# Patient Record
Sex: FEMALE | Race: OTHER | NOT HISPANIC OR LATINO | URBAN - METROPOLITAN AREA
[De-identification: names, ages, dates, MRNs, and addresses within clinical notes are randomized per-mention and may not be internally consistent; named-entity substitution may affect disease eponyms.]

---

## 2017-03-18 ENCOUNTER — OUTPATIENT (OUTPATIENT)
Dept: OUTPATIENT SERVICES | Facility: HOSPITAL | Age: 52
LOS: 1 days | Discharge: HOME | End: 2017-03-18

## 2017-06-27 DIAGNOSIS — K75.9 INFLAMMATORY LIVER DISEASE, UNSPECIFIED: ICD-10-CM

## 2017-06-27 DIAGNOSIS — D64.9 ANEMIA, UNSPECIFIED: ICD-10-CM

## 2017-06-27 DIAGNOSIS — E78.9 DISORDER OF LIPOPROTEIN METABOLISM, UNSPECIFIED: ICD-10-CM

## 2018-01-17 ENCOUNTER — OUTPATIENT (OUTPATIENT)
Dept: OUTPATIENT SERVICES | Facility: HOSPITAL | Age: 53
LOS: 1 days | Discharge: HOME | End: 2018-01-17

## 2018-01-17 DIAGNOSIS — B26.9 MUMPS WITHOUT COMPLICATION: ICD-10-CM

## 2018-01-17 DIAGNOSIS — K11.23 CHRONIC SIALOADENITIS: ICD-10-CM

## 2018-01-17 DIAGNOSIS — D64.9 ANEMIA, UNSPECIFIED: ICD-10-CM

## 2018-01-17 DIAGNOSIS — I10 ESSENTIAL (PRIMARY) HYPERTENSION: ICD-10-CM

## 2018-01-17 DIAGNOSIS — E53.8 DEFICIENCY OF OTHER SPECIFIED B GROUP VITAMINS: ICD-10-CM

## 2018-01-17 DIAGNOSIS — K75.9 INFLAMMATORY LIVER DISEASE, UNSPECIFIED: ICD-10-CM

## 2018-01-17 DIAGNOSIS — E11.9 TYPE 2 DIABETES MELLITUS WITHOUT COMPLICATIONS: ICD-10-CM

## 2018-01-17 DIAGNOSIS — E03.9 HYPOTHYROIDISM, UNSPECIFIED: ICD-10-CM

## 2018-01-17 DIAGNOSIS — N39.0 URINARY TRACT INFECTION, SITE NOT SPECIFIED: ICD-10-CM

## 2018-01-17 DIAGNOSIS — E78.5 HYPERLIPIDEMIA, UNSPECIFIED: ICD-10-CM

## 2018-02-19 ENCOUNTER — OUTPATIENT (OUTPATIENT)
Dept: OUTPATIENT SERVICES | Facility: HOSPITAL | Age: 53
LOS: 1 days | Discharge: HOME | End: 2018-02-19

## 2018-02-19 DIAGNOSIS — R06.02 SHORTNESS OF BREATH: ICD-10-CM

## 2018-09-27 ENCOUNTER — OUTPATIENT (OUTPATIENT)
Dept: OUTPATIENT SERVICES | Facility: HOSPITAL | Age: 53
LOS: 1 days | Discharge: HOME | End: 2018-09-27

## 2018-09-27 DIAGNOSIS — I10 ESSENTIAL (PRIMARY) HYPERTENSION: ICD-10-CM

## 2018-09-27 DIAGNOSIS — E83.42 HYPOMAGNESEMIA: ICD-10-CM

## 2018-09-27 DIAGNOSIS — N39.0 URINARY TRACT INFECTION, SITE NOT SPECIFIED: ICD-10-CM

## 2018-09-27 DIAGNOSIS — D64.9 ANEMIA, UNSPECIFIED: ICD-10-CM

## 2018-09-27 DIAGNOSIS — E78.5 HYPERLIPIDEMIA, UNSPECIFIED: ICD-10-CM

## 2018-09-27 DIAGNOSIS — E55.9 VITAMIN D DEFICIENCY, UNSPECIFIED: ICD-10-CM

## 2018-09-27 DIAGNOSIS — E53.8 DEFICIENCY OF OTHER SPECIFIED B GROUP VITAMINS: ICD-10-CM

## 2018-09-27 DIAGNOSIS — E03.9 HYPOTHYROIDISM, UNSPECIFIED: ICD-10-CM

## 2018-09-27 DIAGNOSIS — E11.9 TYPE 2 DIABETES MELLITUS WITHOUT COMPLICATIONS: ICD-10-CM

## 2018-09-27 DIAGNOSIS — K75.9 INFLAMMATORY LIVER DISEASE, UNSPECIFIED: ICD-10-CM

## 2020-12-22 ENCOUNTER — OUTPATIENT (OUTPATIENT)
Dept: OUTPATIENT SERVICES | Facility: HOSPITAL | Age: 55
LOS: 1 days | Discharge: HOME | End: 2020-12-22
Payer: COMMERCIAL

## 2020-12-22 DIAGNOSIS — R10.12 LEFT UPPER QUADRANT PAIN: ICD-10-CM

## 2020-12-22 DIAGNOSIS — E04.1 NONTOXIC SINGLE THYROID NODULE: ICD-10-CM

## 2020-12-22 PROCEDURE — 76536 US EXAM OF HEAD AND NECK: CPT | Mod: 26

## 2020-12-22 PROCEDURE — 76700 US EXAM ABDOM COMPLETE: CPT | Mod: 26

## 2020-12-28 ENCOUNTER — OUTPATIENT (OUTPATIENT)
Dept: OUTPATIENT SERVICES | Facility: HOSPITAL | Age: 55
LOS: 1 days | Discharge: HOME | End: 2020-12-28
Payer: COMMERCIAL

## 2020-12-28 DIAGNOSIS — Z12.31 ENCOUNTER FOR SCREENING MAMMOGRAM FOR MALIGNANT NEOPLASM OF BREAST: ICD-10-CM

## 2020-12-28 DIAGNOSIS — R91.1 SOLITARY PULMONARY NODULE: ICD-10-CM

## 2020-12-28 PROCEDURE — 71250 CT THORAX DX C-: CPT | Mod: 26

## 2020-12-28 PROCEDURE — 77067 SCR MAMMO BI INCL CAD: CPT | Mod: 26

## 2020-12-28 PROCEDURE — 77063 BREAST TOMOSYNTHESIS BI: CPT | Mod: 26

## 2021-06-22 VITALS
HEIGHT: 65 IN | DIASTOLIC BLOOD PRESSURE: 80 MMHG | WEIGHT: 163.14 LBS | SYSTOLIC BLOOD PRESSURE: 130 MMHG | TEMPERATURE: 97 F | BODY MASS INDEX: 27.18 KG/M2 | HEART RATE: 70 BPM

## 2023-02-15 ENCOUNTER — NON-APPOINTMENT (OUTPATIENT)
Age: 58
End: 2023-02-15

## 2023-02-15 PROBLEM — Z00.00 ENCOUNTER FOR PREVENTIVE HEALTH EXAMINATION: Status: ACTIVE | Noted: 2023-02-15

## 2023-02-15 RX ORDER — FENOFIBRATE 145 MG/1
145 TABLET, COATED ORAL AT BEDTIME
Refills: 0 | Status: ACTIVE | COMMUNITY

## 2023-02-15 RX ORDER — VALSARTAN AND HYDROCHLOROTHIAZIDE 320; 25 MG/1; MG/1
320-25 TABLET, FILM COATED ORAL DAILY
Refills: 0 | Status: ACTIVE | COMMUNITY

## 2023-05-16 NOTE — PHYSICAL EXAM
[Well Developed] : well developed [Normal Conjunctiva] : normal conjunctiva [Normal Venous Pressure] : normal venous pressure [Normal S1, S2] : normal S1, S2 [Clear Lung Fields] : clear lung fields [Soft] : abdomen soft [Normal Gait] : normal gait [No Edema] : no edema [No Rash] : no rash [Moves all extremities] : moves all extremities [Alert and Oriented] : alert and oriented

## 2023-05-17 ENCOUNTER — APPOINTMENT (OUTPATIENT)
Dept: CARDIOLOGY | Facility: CLINIC | Age: 58
End: 2023-05-17
Payer: MEDICAID

## 2023-05-17 VITALS
BODY MASS INDEX: 26.22 KG/M2 | SYSTOLIC BLOOD PRESSURE: 140 MMHG | HEART RATE: 70 BPM | DIASTOLIC BLOOD PRESSURE: 90 MMHG | WEIGHT: 163.13 LBS | HEIGHT: 66 IN

## 2023-05-17 VITALS — DIASTOLIC BLOOD PRESSURE: 90 MMHG | SYSTOLIC BLOOD PRESSURE: 130 MMHG

## 2023-05-17 DIAGNOSIS — R94.31 ABNORMAL ELECTROCARDIOGRAM [ECG] [EKG]: ICD-10-CM

## 2023-05-17 PROCEDURE — 93000 ELECTROCARDIOGRAM COMPLETE: CPT

## 2023-05-17 PROCEDURE — 99204 OFFICE O/P NEW MOD 45 MIN: CPT | Mod: 25

## 2023-05-17 RX ORDER — ATORVASTATIN CALCIUM 20 MG/1
20 TABLET, FILM COATED ORAL
Refills: 0 | Status: ACTIVE | COMMUNITY
Start: 2023-05-17

## 2023-06-19 ENCOUNTER — APPOINTMENT (OUTPATIENT)
Dept: CARDIOLOGY | Facility: CLINIC | Age: 58
End: 2023-06-19
Payer: MEDICAID

## 2023-06-19 PROCEDURE — 93306 TTE W/DOPPLER COMPLETE: CPT

## 2023-07-11 ENCOUNTER — OUTPATIENT (OUTPATIENT)
Dept: OUTPATIENT SERVICES | Facility: HOSPITAL | Age: 58
LOS: 1 days | End: 2023-07-11
Payer: MEDICAID

## 2023-07-11 DIAGNOSIS — R94.31 ABNORMAL ELECTROCARDIOGRAM [ECG] [EKG]: ICD-10-CM

## 2023-07-11 DIAGNOSIS — Z00.8 ENCOUNTER FOR OTHER GENERAL EXAMINATION: ICD-10-CM

## 2023-07-11 PROCEDURE — 78452 HT MUSCLE IMAGE SPECT MULT: CPT

## 2023-07-11 PROCEDURE — A9500: CPT

## 2023-07-11 PROCEDURE — 93018 CV STRESS TEST I&R ONLY: CPT

## 2023-07-11 PROCEDURE — 78452 HT MUSCLE IMAGE SPECT MULT: CPT | Mod: 26

## 2023-07-12 DIAGNOSIS — R94.31 ABNORMAL ELECTROCARDIOGRAM [ECG] [EKG]: ICD-10-CM

## 2023-07-14 NOTE — HISTORY OF PRESENT ILLNESS
[FreeTextEntry1] : Pt with HTN, HLD, sent for cv evaluation for the last 2-3 months pt having sharp cp usually at rest with no sob, sometimes diaphoresis but not sure if it occurs with cp. pt does not exercise.\par \par 6/10/21: EXERCISE STRESS NUCLEAR: NEGATIVE FOR ISCHEMIA PT EXERCISED FOR 8:30 WITH NO CP \par \par 6/22/21: Pt says sharp cp at rest on/off once a month, minor, lasing 2 minutes to 1 hour. pt admits to a lot of stress.\par \par 5/17/23: \par Patient states she is having cp like before described as sometimes sharp lasting for a few seconds to a few minutes  at rest denies sob no diaphoresis. Patient walks one mile almost daily. Patient states she checks BP at home 130-140/80-90. Patient under stress. \par pt no longer on fenofibrate and on atorvastatin. \par pt says cp not worse with deep inspirations. \par \par ekg: nsr with irbbb with flatted t waves v3 to v6 with flatterning lead III/avf different than in 2021. \par \par 7/27/23:\par 7/11/23: Nuclear: Normal myocardial perfusion. No evidence of ischemia or infarction. Stress EKG was negative for ischemia. The Duke treadmill score is +7, which confers low risk. LVEF of > 55%

## 2023-07-14 NOTE — DISCUSSION/SUMMARY
[EKG obtained to assist in diagnosis and management of assessed problem(s)] : EKG obtained to assist in diagnosis and management of assessed problem(s) [FreeTextEntry1] : Get Blood Work \par get exercise stress nuclear \par 2 d echo \par f/u in after testing \par baseline st changes will get nuclear. \par start asa 81 mg po qd for time being \par if severe cp go to hospital to er

## 2023-07-14 NOTE — CARDIOLOGY SUMMARY
[de-identified] : 7/11/23: Nuclear: Normal myocardial perfusion. No evidence of ischemia or infarction. Stress EKG was negative for ischemia. The Duke treadmill score is +7, which confers low risk. LVEF of > 55%

## 2023-07-27 ENCOUNTER — APPOINTMENT (OUTPATIENT)
Dept: CARDIOLOGY | Facility: CLINIC | Age: 58
End: 2023-07-27
Payer: MEDICAID

## 2023-07-27 VITALS — HEIGHT: 65 IN | WEIGHT: 165 LBS | BODY MASS INDEX: 27.49 KG/M2

## 2023-07-27 VITALS — DIASTOLIC BLOOD PRESSURE: 80 MMHG | SYSTOLIC BLOOD PRESSURE: 130 MMHG | HEART RATE: 80 BPM

## 2023-07-27 DIAGNOSIS — R73.03 PREDIABETES.: ICD-10-CM

## 2023-07-27 DIAGNOSIS — E88.81 METABOLIC SYNDROME: ICD-10-CM

## 2023-07-27 DIAGNOSIS — E78.2 MIXED HYPERLIPIDEMIA: ICD-10-CM

## 2023-07-27 DIAGNOSIS — R07.89 OTHER CHEST PAIN: ICD-10-CM

## 2023-07-27 PROCEDURE — 99214 OFFICE O/P EST MOD 30 MIN: CPT

## 2023-07-27 NOTE — HISTORY OF PRESENT ILLNESS
[FreeTextEntry1] : Pt with HTN, HLD, IRBBB LAE, prediabetic, metabolic syndrome. \par \par 6/10/21: EXERCISE STRESS NUCLEAR: NEGATIVE FOR ISCHEMIA PT EXERCISED FOR 8:30 WITH NO CP \par \par 21: Pt says sharp cp at rest on/off once a month, minor, lasing 2 minutes to 1 hour. pt admits to a lot of stress.\par \par 23: \par Patient states she is having cp like before described as sometimes sharp lasting for a few seconds to a few minutes  at rest denies sob no diaphoresis. Patient walks one mile almost daily. Patient states she checks BP at home 130-140/80-90. Patient under stress. \par pt no longer on fenofibrate and on atorvastatin. \par pt says cp not worse with deep inspirations. \par \par ekg: nsr with irbbb with flatted t waves v3 to v6 with flatterning lead III/avf different than in . \par \par 23:\par 23: Nuclear: Normal myocardial perfusion. No evidence of ischemia The Duke treadmill score is +7, which confers low risk. LVEF of > 55%\par 23: EF: 65%. E' sept: 0.06, CO: 6.1, DD1, borderline LAE \par :  HDL 38 T a1c: 5.7 \par d/w patient and has been on atorvastatin/fenofibrate for 2 years and says had bloodwork 2 weeks ago at [facility]. \par pt apparently in november as bad diet from being in [place]. \par pt does not exercise and had recent child on .

## 2023-07-27 NOTE — CARDIOLOGY SUMMARY
[de-identified] : nsr irbbb with flattened t waves v3 to v6  [de-identified] : 6/20/23: EF: 65%. E' sept: 0.06, CO: 6.1, DD1, borderlone LAE  [de-identified] : 7/11/23: Nuclear: Normal myocardial perfusion. No evidence of ischemia or infarction. Stress EKG was negative for ischemia. The Duke treadmill score is +7, which confers low risk. LVEF of > 55%

## 2023-07-27 NOTE — DISCUSSION/SUMMARY
[FreeTextEntry1] : Get Blood Work \par continue atorvastatin/fenofibrate \par and exericise more for HDL d/w patient at length \par get bloodwork \par f/u in 6 months \par

## 2024-01-17 NOTE — CARDIOLOGY SUMMARY
[de-identified] : nsr irbbb with flattened t waves v3 to v6  [de-identified] : 6/20/23: EF: 65%. E' sept: 0.06, CO: 6.1, DD1, borderlone LAE  [de-identified] : 7/11/23: Nuclear: Normal myocardial perfusion. No evidence of ischemia or infarction. Stress EKG was negative for ischemia. The Duke treadmill score is +7, which confers low risk. LVEF of > 55%

## 2024-01-18 ENCOUNTER — APPOINTMENT (OUTPATIENT)
Dept: CARDIOLOGY | Facility: CLINIC | Age: 59
End: 2024-01-18

## 2024-02-08 ENCOUNTER — APPOINTMENT (OUTPATIENT)
Dept: NEUROLOGY | Facility: CLINIC | Age: 59
End: 2024-02-08
Payer: MEDICAID

## 2024-02-08 VITALS — WEIGHT: 160 LBS | HEIGHT: 66 IN | BODY MASS INDEX: 25.71 KG/M2

## 2024-02-08 VITALS — DIASTOLIC BLOOD PRESSURE: 74 MMHG | SYSTOLIC BLOOD PRESSURE: 149 MMHG | HEART RATE: 78 BPM

## 2024-02-08 DIAGNOSIS — R79.89 OTHER SPECIFIED ABNORMAL FINDINGS OF BLOOD CHEMISTRY: ICD-10-CM

## 2024-02-08 DIAGNOSIS — R60.9 EDEMA, UNSPECIFIED: ICD-10-CM

## 2024-02-08 DIAGNOSIS — M54.12 RADICULOPATHY, CERVICAL REGION: ICD-10-CM

## 2024-02-08 PROCEDURE — G2211 COMPLEX E/M VISIT ADD ON: CPT | Mod: NC,1L

## 2024-02-08 PROCEDURE — 99204 OFFICE O/P NEW MOD 45 MIN: CPT

## 2024-02-08 RX ORDER — CYANOCOBALAMIN (VITAMIN B-12) 1000 MCG
1000 TABLET ORAL
Qty: 30 | Refills: 5 | Status: ACTIVE | COMMUNITY
Start: 2024-02-08 | End: 1900-01-01

## 2024-02-08 NOTE — PHYSICAL EXAM

## 2024-02-08 NOTE — HISTORY OF PRESENT ILLNESS
[FreeTextEntry1] : It's a pleasure to see Ms. CLAUDINE LU In the office today. She is a 58 year -  old none English speaking woman  who presents to the office today for the evaluation of headache, swelling and numbness in the arm.  She reports that her headache started acutely.  It was initially associated with swelling under her right eye.  The swelling had gone away but the headache persisted.  Additionally she has walking up 1 morning with numbness in the left arm and has been developing spasm in the fingers.  She recently had blood work which showed low B12 level.  She denies any significant weakness, numbness, urinary/bowel incontinence today

## 2024-02-08 NOTE — ASSESSMENT
[FreeTextEntry1] : Headache-most likely cervicogenic -Swelling below her right eye associated with pain in the temporal region of unclear etiology, we will send her for ESR /CRP to rule out temporal arteritis - MRI of the Brain without Fletcher.   Cervical radiculopathy - MRI of the Cervical spine Without Fletcher.  - EMG of the upper extremities.  - Trial of physical therapy for the neck.   Low B12 level - Vitamin B12.1000mg Once a day.    I, Lia Castellanos, Attest that this documentation has been prepared under the direction and in the presence of Provider Vern Palmer DO  Thank You for letting me assist in the management of this patient.   Vern Palmer DO Board Certified, Neurology

## 2024-02-23 ENCOUNTER — LABORATORY RESULT (OUTPATIENT)
Age: 59
End: 2024-02-23

## 2024-02-23 ENCOUNTER — APPOINTMENT (OUTPATIENT)
Dept: NEUROLOGY | Facility: CLINIC | Age: 59
End: 2024-02-23
Payer: MEDICAID

## 2024-02-23 PROCEDURE — 95886 MUSC TEST DONE W/N TEST COMP: CPT

## 2024-02-23 PROCEDURE — 95911 NRV CNDJ TEST 9-10 STUDIES: CPT

## 2024-03-01 ENCOUNTER — APPOINTMENT (OUTPATIENT)
Dept: MRI IMAGING | Facility: CLINIC | Age: 59
End: 2024-03-01
Payer: MEDICAID

## 2024-03-01 PROCEDURE — 70551 MRI BRAIN STEM W/O DYE: CPT

## 2024-03-05 ENCOUNTER — APPOINTMENT (OUTPATIENT)
Dept: NEUROLOGY | Facility: CLINIC | Age: 59
End: 2024-03-05
Payer: MEDICAID

## 2024-03-05 VITALS
BODY MASS INDEX: 25.71 KG/M2 | HEIGHT: 66 IN | HEART RATE: 72 BPM | SYSTOLIC BLOOD PRESSURE: 162 MMHG | WEIGHT: 160 LBS | DIASTOLIC BLOOD PRESSURE: 100 MMHG

## 2024-03-05 DIAGNOSIS — I67.9 CEREBROVASCULAR DISEASE, UNSPECIFIED: ICD-10-CM

## 2024-03-05 DIAGNOSIS — M19.049 PRIMARY OSTEOARTHRITIS, UNSPECIFIED HAND: ICD-10-CM

## 2024-03-05 DIAGNOSIS — I10 ESSENTIAL (PRIMARY) HYPERTENSION: ICD-10-CM

## 2024-03-05 DIAGNOSIS — R51.9 HEADACHE, UNSPECIFIED: ICD-10-CM

## 2024-03-05 PROCEDURE — 99214 OFFICE O/P EST MOD 30 MIN: CPT

## 2024-03-05 NOTE — REASON FOR VISIT
[Follow-Up: _____] : a [unfilled] follow-up visit [Consultation] : a consultation visit [FreeTextEntry1] : Hand numbness / Headaches..

## 2024-03-05 NOTE — REVIEW OF SYSTEMS
[Hand Weakness] :  hand weakness [Tension Headache] : tension-type headaches [Negative] : Heme/Lymph

## 2024-03-05 NOTE — HISTORY OF PRESENT ILLNESS
[FreeTextEntry1] : Original Presentation : It's a pleasure to see Ms. CLAUDINE LU In the office today. She is a 58 year - old none English speaking woman who presents to the office today for the evaluation of headache, swelling and numbness in the arm. She reports that her headache started acutely. It was initially associated with swelling under her right eye. The swelling had gone away but the headache persisted. Additionally she has walking up 1 morning with numbness in the left arm and has been developing spasm in the fingers. She recently had blood work which showed low B12 level. 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 Scattered flair hyperintensities likely microvascular. Otherwise normal MRI brain.   EMG Upper extremities  : Normal   LAbs :   ESR :21   Serum Rheumatoid Factor : 17 (0-13)    Today : Today I had the pleasure of seeing  Ms. Lu  in follow up. Thier prior history and testing have been reviewed.    Patient remains under our care for bilateral hand pain and joint swelling as well as episodic headaches. Regarding her headaches today we reviewed the results of her MRI of the Brain which revealed a Ranthke cleft as well as scattered flair hyperintensities likely microvascular ischemic changes. BP during todays visit was 162/100mmHg and we discussed the importance of BP management in the setting of cerebrovascular disease as well as sign symptoms and risk factors for stroke. Patient is on HTN medication and statin therapy and today I advised she share these results with her PCP and f/u with her cardiologist. Patient states her headaches are not frequent and she denies any episodes of syncope, altered mental status, unilateral weakness, vision changes ect. As for her hand pain, EMG testing reviewed today of the upper extremities was negative for pathology. On exam patients metacarpal joints appear red and mildly swollen and hand  is slightly limited to pain and stiffness. Patient had a mildly elevated Rheumatoid Factor and ESR on recent lab work and today we discussed following up with her PCP and potentially seeing a Rheumatologist for further evaluation.

## 2024-03-05 NOTE — PHYSICAL EXAM
[General Appearance - Alert] : alert [General Appearance - In No Acute Distress] : in no acute distress [Oriented To Time, Place, And Person] : oriented to person, place, and time [Impaired Insight] : insight and judgment were intact [Affect] : the affect was normal [Person] : oriented to person [Time] : oriented to time [Place] : oriented to place [Visual Intact] : visual attention was ~T not ~L decreased [Concentration Intact] : normal concentrating ability [Repeating Phrases] : no difficulty repeating a phrase [Naming Objects] : no difficulty naming common objects [Writing A Sentence] : no difficulty writing a sentence [Fluency] : fluency intact [Comprehension] : comprehension intact [Reading] : reading intact [Cranial Nerves Optic (II)] : visual acuity intact bilaterally,  visual fields full to confrontation, pupils equal round and reactive to light [Past History] : adequate knowledge of personal past history [Cranial Nerves Trigeminal (V)] : facial sensation intact symmetrically [Cranial Nerves Oculomotor (III)] : extraocular motion intact [Cranial Nerves Glossopharyngeal (IX)] : tongue and palate midline [Cranial Nerves Vestibulocochlear (VIII)] : hearing was intact bilaterally [Cranial Nerves Facial (VII)] : face symmetrical [Cranial Nerves Accessory (XI - Cranial And Spinal)] : head turning and shoulder shrug symmetric [Cranial Nerves Hypoglossal (XII)] : there was no tongue deviation with protrusion [Motor Strength] : muscle strength was normal in all four extremities [No Muscle Atrophy] : normal bulk in all four extremities [Motor Strength Upper Extremities Bilaterally] : strength was normal in both upper extremities [Motor Strength Lower Extremities Bilaterally] : strength was normal in both lower extremities [Sensation Tactile Decrease] : light touch was intact [Balance] : balance was intact [Tremor] : no tremor present [Past-pointing] : there was no past-pointing [Plantar Reflex Right Only] : normal on the right [2+] : Ankle jerk left 2+ [FreeTextEntry6] : Hang  weakness secondary to joint stiffness and pain  [Plantar Reflex Left Only] : normal on the left [Extraocular Movements] : extraocular movements were intact [PERRL With Normal Accommodation] : pupils were equal in size, round, reactive to light, with normal accommodation [Hearing Threshold Finger Rub Not Divide] : hearing was normal [Neck Appearance] : the appearance of the neck was normal [Abnormal Walk] : normal gait [No Spinal Tenderness] : no spinal tenderness [Involuntary Movements] : no involuntary movements were seen [Motor Tone] : muscle strength and tone were normal

## 2024-03-05 NOTE — ASSESSMENT
[FreeTextEntry1] : 58 year old female with HTN, HLD, DM, low B12 being evaluated  for cerebrovascular disease, headaches and bilateral hand pain and swelling. 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 BP during todays visit was 162/100mmHg and we discussed the importance of managing her BP, HLD and glycemic control to reduce stroke risk factors and comorbidity. She is under the care of a cardiologist who she states she will follow up with to discuss these results.  EMG testing reviewed today of the upper extremities was negative for abnormal pathology. Lab work revealed mildly elevated ESR and + Rheumatoid Factor. Patient was advised to follow up with her PCP and consider seeing a Rheumatologist. She will return to the office after seeing the above providers.  Patient is aware that they may call/ contact the office at any time if they have any additional questions or concerns regarding their management.   Supervising Physician : Vern Palmer, DO

## 2024-06-19 LAB
ALBUMIN SERPL ELPH-MCNC: 4.3 G/DL
ALP BLD-CCNC: 54 U/L
ALT SERPL-CCNC: 21 U/L
ANA SER IF-ACNC: NEGATIVE
ANION GAP SERPL CALC-SCNC: 12 MMOL/L
AST SERPL-CCNC: 19 U/L
BILIRUB SERPL-MCNC: 0.2 MG/DL
BUN SERPL-MCNC: 13 MG/DL
CALCIUM SERPL-MCNC: 9.2 MG/DL
CELIACPAN: NORMAL
CHLORIDE SERPL-SCNC: 100 MMOL/L
CO2 SERPL-SCNC: 27 MMOL/L
CREAT SERPL-MCNC: 0.68 MG/DL
CRP SERPL-MCNC: <3 MG/L
DSDNA AB SER-ACNC: 27 IU/ML
EGFR: 101 ML/MIN/1.73M2
ENA SS-A AB SER IA-ACNC: 0.2 AL
ENA SS-B AB SER IA-ACNC: <0.2 AL
ERYTHROCYTE [SEDIMENTATION RATE] IN BLOOD BY WESTERGREN METHOD: 21 MM/HR
GLUCOSE SERPL-MCNC: 97 MG/DL
POTASSIUM SERPL-SCNC: 4 MMOL/L
PROT SERPL-MCNC: 7 G/DL
RHEUMATOID FACT SER QL: 17 IU/ML
SODIUM SERPL-SCNC: 139 MMOL/L
T3FREE SERPL-MCNC: 2.96 PG/ML
T4 FREE SERPL-MCNC: 1.2 NG/DL
TSH SERPL-ACNC: 0.72 UIU/ML

## 2024-09-20 ENCOUNTER — NON-APPOINTMENT (OUTPATIENT)
Age: 59
End: 2024-09-20

## 2024-09-25 ENCOUNTER — EMERGENCY (EMERGENCY)
Facility: HOSPITAL | Age: 59
LOS: 0 days | Discharge: HOME | End: 2024-09-25
Attending: STUDENT IN AN ORGANIZED HEALTH CARE EDUCATION/TRAINING PROGRAM
Payer: MEDICAID

## 2024-09-25 VITALS
OXYGEN SATURATION: 100 % | DIASTOLIC BLOOD PRESSURE: 102 MMHG | RESPIRATION RATE: 18 BRPM | WEIGHT: 154.98 LBS | TEMPERATURE: 98 F | SYSTOLIC BLOOD PRESSURE: 163 MMHG | HEART RATE: 79 BPM

## 2024-09-25 DIAGNOSIS — M25.521 PAIN IN RIGHT ELBOW: ICD-10-CM

## 2024-09-25 DIAGNOSIS — E78.5 HYPERLIPIDEMIA, UNSPECIFIED: ICD-10-CM
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
## 2024-09-25 DIAGNOSIS — M79.621 PAIN IN RIGHT UPPER ARM: ICD-10-CM

## 2024-09-25 DIAGNOSIS — I10 ESSENTIAL (PRIMARY) HYPERTENSION: ICD-10-CM

## 2024-09-25 DIAGNOSIS — M62.838 OTHER MUSCLE SPASM: ICD-10-CM
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
## 2024-09-25 PROCEDURE — 99284 EMERGENCY DEPT VISIT MOD MDM: CPT

## 2024-09-25 PROCEDURE — 73090 X-RAY EXAM OF FOREARM: CPT | Mod: RT

## 2024-09-25 PROCEDURE — 99284 EMERGENCY DEPT VISIT MOD MDM: CPT | Mod: 25

## 2024-09-25 PROCEDURE — 73060 X-RAY EXAM OF HUMERUS: CPT | Mod: 26,RT

## 2024-09-25 PROCEDURE — 73090 X-RAY EXAM OF FOREARM: CPT | Mod: 26,RT

## 2024-09-25 PROCEDURE — 73080 X-RAY EXAM OF ELBOW: CPT | Mod: 26,RT

## 2024-09-25 PROCEDURE — 73060 X-RAY EXAM OF HUMERUS: CPT | Mod: RT

## 2024-09-25 PROCEDURE — 73080 X-RAY EXAM OF ELBOW: CPT | Mod: RT

## 2024-09-25 NOTE — ED PROVIDER NOTE - PHYSICAL EXAMINATION
CONST: Well appearing in NAD  EYES: EOMI, Sclera and conjunctiva clear.   ENT: No nasal discharge.   CARD: Normal S1 S2; Normal rate and rhythm  RESP: Equal BS B/L, No wheezes, rhonchi or rales. No distress  GI: Soft, non-distended.  MS: Normal ROM in all extremities. TTP right medial elbow and distal humerus with muscle spasm  SKIN: Warm, dry, no acute rashes. Good turgor  NEURO: A&Ox3, No focal deficits. Strength 5/5 with no sensory deficits. Steady gait

## 2024-09-25 NOTE — ED PROVIDER NOTE - NSFOLLOWUPINSTRUCTIONS_ED_ALL_ED_FT
FOLLOW UP WITH YOUR PRIMARY DOCTOR  RETURN TO ED FOR NEW OR WORSENING SYMPTOMS    Muscle Spasm    WHAT YOU NEED TO KNOW:    A muscle spasm is a sudden contraction of any muscle or group of muscles. A muscle cramp is a painful muscle spasm. Muscle cramps commonly occur after intense exercise or during pregnancy. They may also be caused by certain medications, dehydration, low calcium or magnesium levels, or another medical condition.    DISCHARGE INSTRUCTIONS:    Medicines: You may need the following:    NSAIDs help decrease swelling and pain or fever. This medicine is available with or without a doctor's order. NSAIDs can cause stomach bleeding or kidney problems in certain people. If you take blood thinner medicine, always ask your healthcare provider if NSAIDs are safe for you. Always read the medicine label and follow directions.    Take your medicine as directed. Contact your healthcare provider if you think your medicine is not helping or if you have side effects. Tell your provider if you are allergic to any medicine. Keep a list of the medicines, vitamins, and herbs you take. Include the amounts, and when and why you take them. Bring the list or the pill bottles to follow-up visits. Carry your medicine list with you in case of an emergency.  Follow up with your healthcare provider as directed: You may need other tests or treatment. You may also be referred to a physical therapist or other specialist. Write down your questions so you remember to ask them during your visits.    Self-care:    Stretch your muscle to help relieve the cramp. It may be helpful to keep your muscle in the stretched position until the cramp is gone.    Apply heat to help decrease pain and muscle spasms. Apply heat on the area for 20 to 30 minutes every 2 hours for as many days as directed.    Apply ice to help decrease swelling and pain. Ice may also help prevent tissue damage. Use an ice pack, or put crushed ice in a plastic bag. Cover it with a towel and place it on your muscle for 15 to 20 minutes every hour or as directed.    Drink more liquids to help prevent muscle cramps caused by dehydration. Sports drinks may help replace electrolytes you lose through sweat during exercise. Ask your healthcare provider how much liquid to drink each day and which liquids are best for you.    Eat healthy foods, such as fruits, vegetables, whole grains, low-fat dairy products, and lean proteins (meat, beans, and fish). If you are pregnant, ask your healthcare provider about foods that are high in magnesium and sodium. They may help to relieve cramps during pregnancy.    Massage your muscle to help relieve the cramp.    Take frequent deep breaths until the cramp feels better. Lie down while you take the deep breaths so you do not get dizzy or lightheaded.  Contact your healthcare provider if:    You have signs of dehydration, such as a headache, dark yellow urine, dry eyes or mouth, or a fast heartbeat.    You have questions or concerns about your condition or care.  Return to the emergency department if:    You have warmth, swelling, or redness in the cramping muscle.    You have frequent or unrelieved muscle cramps in several different muscles.    You have muscle cramps with numbness, tingling, and burning in your hands and feet.

## 2024-09-25 NOTE — ED PROVIDER NOTE - CLINICAL SUMMARY MEDICAL DECISION MAKING FREE TEXT BOX
pt with right elbow pain after lifting a heavy bag.    bicep tightness with tenderness, no step offs or deformities, concern for bicep spasm.    XR interpretation: No fx, independently interpretted by Demetrio Trejo DO    Appropriate medications for patient's presenting complaints were ordered and effects were reassessed. Patient's external records were reviewed    Escalation to admission and/or observation was considered.  Patient feels much better and is comfortable with discharge.  Appropriate follow-up was arranged.

## 2024-09-25 NOTE — ED PROVIDER NOTE - OBJECTIVE STATEMENT
patient is a 60yo female PMH htn, hld coming to ED for right upper arm pain x1 week. pt reports lifting something heavy last week, felt pain after in right arm/ elbow. pain now radiates to upper arm. has taken Aleve with relief. denies fall, bruising, numbness/ paresthesias, fever, weakness, rash

## 2024-09-25 NOTE — ED PROVIDER NOTE - PATIENT PORTAL LINK FT
You can access the FollowMyHealth Patient Portal offered by [facility] by registering at the following website: [email or web address]. By joining [employer]’s FollowMyHealth portal, you will also be able to view your health information using other applications (apps) compatible with our system.

## 2025-04-07 ENCOUNTER — APPOINTMENT (OUTPATIENT)
Dept: NEUROLOGY | Facility: CLINIC | Age: 60
End: 2025-04-07